# Patient Record
Sex: FEMALE | Race: WHITE | Employment: UNEMPLOYED | URBAN - METROPOLITAN AREA
[De-identification: names, ages, dates, MRNs, and addresses within clinical notes are randomized per-mention and may not be internally consistent; named-entity substitution may affect disease eponyms.]

---

## 2017-01-13 ENCOUNTER — TRANSFERRED RECORDS (OUTPATIENT)
Dept: HEALTH INFORMATION MANAGEMENT | Facility: CLINIC | Age: 62
End: 2017-01-13

## 2017-01-13 ENCOUNTER — MEDICAL CORRESPONDENCE (OUTPATIENT)
Dept: HEALTH INFORMATION MANAGEMENT | Facility: CLINIC | Age: 62
End: 2017-01-13

## 2017-01-24 ENCOUNTER — PRE VISIT (OUTPATIENT)
Dept: OTOLARYNGOLOGY | Facility: CLINIC | Age: 62
End: 2017-01-24

## 2017-01-24 NOTE — TELEPHONE ENCOUNTER
ENT PRE VISIT NOTE    On the phone call:    Date of Call: January 24, 2017  Date of appointment: February 2, 2017  Reason for Call (Should match scheduling appointment note): Inspiratory stridor, subglottic stenosis   Who made the initial call:  (patient, Parent (Name of Parent), referral source) Elis & patient   Who is the Referring Provider? (Name, address, phone number, location of clinic) [facility]  Where have you been seen for this condition? [facility]   Where and what testing has been done including: None  ENT related surgeries in the past: No       Request medical records: No  From where:   What date:   Who did you speak to:  Is the information already in the EMR? No         Have films been pushed to PAC and when? no.     Have slides been sent to pathology and when?      Have outside records been received? Yes

## 2017-02-03 ENCOUNTER — OFFICE VISIT (OUTPATIENT)
Dept: OTOLARYNGOLOGY | Facility: CLINIC | Age: 62
End: 2017-02-03

## 2017-02-03 VITALS — HEIGHT: 68 IN | WEIGHT: 225 LBS | BODY MASS INDEX: 34.1 KG/M2

## 2017-02-03 DIAGNOSIS — R06.00 DYSPNEA: ICD-10-CM

## 2017-02-03 DIAGNOSIS — R43.2 AGEUSIA: ICD-10-CM

## 2017-02-03 DIAGNOSIS — R06.03 RESPIRATORY DISTRESS: ICD-10-CM

## 2017-02-03 DIAGNOSIS — R49.0 DYSPHONIA: Primary | ICD-10-CM

## 2017-02-03 DIAGNOSIS — R43.0 ANOSMIA: Primary | ICD-10-CM

## 2017-02-03 DIAGNOSIS — R49.0 DYSPHONIA: ICD-10-CM

## 2017-02-03 PROBLEM — Z95.0 PRESENCE OF CARDIAC PACEMAKER: Status: ACTIVE | Noted: 2017-02-03

## 2017-02-03 PROBLEM — I49.9 CARDIAC ARRHYTHMIA: Status: ACTIVE | Noted: 2017-02-03

## 2017-02-03 PROBLEM — I49.5 SINOATRIAL NODE DYSFUNCTION (H): Status: ACTIVE | Noted: 2017-02-03

## 2017-02-03 PROBLEM — Q21.11 PERSISTENT OSTIUM SECUNDUM: Status: ACTIVE | Noted: 2017-02-03

## 2017-02-03 PROBLEM — I48.91 ATRIAL FIBRILLATION (H): Status: ACTIVE | Noted: 2017-02-03

## 2017-02-03 PROBLEM — R06.89 DIFFICULTY BREATHING: Status: ACTIVE | Noted: 2017-02-03

## 2017-02-03 PROBLEM — Q24.9 CONGENITAL ANOMALY OF HEART: Status: ACTIVE | Noted: 2017-02-03

## 2017-02-03 PROBLEM — Q25.1 COARCTATION OF AORTA: Status: ACTIVE | Noted: 2017-02-03

## 2017-02-03 PROBLEM — R69: Status: ACTIVE | Noted: 2017-02-03

## 2017-02-03 RX ORDER — PNV NO.95/FERROUS FUM/FOLIC AC 28MG-0.8MG
TABLET ORAL
COMMUNITY

## 2017-02-03 RX ORDER — PREDNISONE 10 MG/1
TABLET ORAL
COMMUNITY
End: 2020-05-13

## 2017-02-03 RX ORDER — LEVOTHYROXINE SODIUM 75 UG/1
TABLET ORAL
COMMUNITY

## 2017-02-03 RX ORDER — POTASSIUM CHLORIDE 1.5 G/1.58G
POWDER, FOR SOLUTION ORAL
COMMUNITY

## 2017-02-03 RX ORDER — IRBESARTAN 300 MG/1
300 TABLET ORAL
COMMUNITY
End: 2020-05-13

## 2017-02-03 RX ORDER — WARFARIN SODIUM 5 MG/1
TABLET ORAL
COMMUNITY
Start: 2014-01-30 | End: 2020-05-13

## 2017-02-03 RX ORDER — WARFARIN SODIUM 5 MG/1
TABLET ORAL
COMMUNITY
End: 2020-05-13

## 2017-02-03 RX ORDER — SPIRONOLACTONE 25 MG/1
TABLET ORAL
COMMUNITY
End: 2020-05-13

## 2017-02-03 RX ORDER — FUROSEMIDE 40 MG
40 TABLET ORAL
COMMUNITY
Start: 2016-05-26 | End: 2020-05-13

## 2017-02-03 ASSESSMENT — PAIN SCALES - GENERAL: PAINLEVEL: NO PAIN (0)

## 2017-02-03 NOTE — Clinical Note
2/3/2017       RE: Kaylah Walker  [address]  [facility] [address]     Dear Colleague,    Thank you for referring your patient, Kaylah Walker, to the [facility]. Please see a copy of my visit note below.    [facility] VOICE [facility]  Carlos Durbin Jr., M.D., F.A.C.S.  Aracelis Sharpe M.D., M.P.H.  Fide Arana, Ph.D., CCC/SLP  Michelle Bliss M.M. (voice), M.A., CCC/SLP  Ronni Valdovinos M.M. (voice), M.A., 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 Sharpe.  Please refer to Dr. Sharpe s dictation for a more complete history and impressions.     DIAGNOSIS/REASON FOR REFERRAL  Dysphonia/ Evaluate, perform laryngeal exam, treat as appropriate    HISTORY OF VOICE DISORDER  Ms. Walker is a 62 year old  with a history of dysphonia.  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    Symptoms worsened and increased in frequency from September through November of 2016, and she began to notice noisy respiration, difficulty breathing, and sudden increased breathing difficulty depending on her posture.    She completed three courses of prednisone.  The first was most beneficial, while the second provided limited help, and the third course was discontinued after she developed high blood pressure.    currently she manages her respiration by more carefully monitoring her posture, and walking more slowly.    She notes that around November, she also lost her sense of smell/ taste, which was a symptom her mother also  "developed, so she has not thought too much about it.    She also has difficulty positively identifying hot and cold temperatures due to a neuropathy    CURRENT SYMPTOMS INCLUDE:  VOICE    Quality changes with posture; unpredictable    Only effortful to speak when she is experiencing shortness of breath symptoms.  COUGH/AIRWAY    Mostly dry    No consistent triggers, other than while symptomatic or with exertion  SWALLOWING    n/a    OTHER PERTINENT HISTORY    Complex medical history: please also refer to Dr. Sharpe's dictation.     Hx of heart and lung issues.    Sodium is restricted    OBJECTIVE FINDINGS  Patient Supplied Answers To [facility] Intake Voice Questionnaire  No flowsheet data found.     Patient Supplied Answers To Last 2 VHI Questionnaires  Voice Handicap Index (VHI-10) 2/1/2017   How often do you have any of the following symptoms:  Indigestion, heartburn, chest pain, stomach acid coming up, and/or tasting acid in your mouth or throat? Never   (F1) My voice makes it difficult for people to hear me. Almost never   (F2) People have difficulty understanding me in a noisy room. Sometimes   (F8) My voice difficulties restrict my personal and social life. Sometimes   (F9) I feel left out of conversations because of my voice. Never   (F10) My voice problem causes me to lose income. Never   (P5) I feel as though I have to strain to produce voice. Never   (P6) The clarity of my voice is unpredictable. Sometimes   (E4) My voice problem upsets me. Almost never   (E6) My voice makes me feel handicapped. Never   (P3) People ask, \"What's wrong with your voice?\" Never   VHI Total Score 8        Patient Supplied Answers To CSI Questionnaire  Cough Severity Index (CSI) 2/1/2017   My cough is worse when I lie down. Sometimes   My coughing problem causes me to restrict my personal and social life. Sometimes   I tend to avoid places because of my cough problem. Sometimes   I feel embarrassed because of my coughing problem. " "Never   People ask, \"What's wrong?\" because I cough a lot. Never   I run out of air when I cough. Sometimes   My coughing problem affects my voice. Sometimes   My coughing problem limits my physical activity. Sometimes   My coughing problem upsets me. Sometimes   People ask me if I am sick because I cough a lot. Sometimes   CSI Total Score 16        Patient Supplied Answers To EAT Questionnaire  Eating Assessment Tool (EAT-10) 1/30/2017   My swallowing problem has caused me to lose weight. 0   My swallowing problem interferes with my ability to go out for meals. 0   Swallowing solids takes extra effort. 0   Swallowing pills takes extra effort. 0   Swallowing is painful. 0   The pleasure of eating is affected by my swallowing. 0   When I swallow food sticks in my throat. 0   I cough when I eat. 0   Swallowing is stressful. 0   How often do things go down the wrong way when you swallow? Rarely   Have you had pneumonia, bronchitis, or another severe lung infection in the last 6 months? No   EAT Total Score 0       VOICE AND SPEECH EVALUATION  An evaluation of the voice, speech and breathing was accomplished and audio recorded today; salient features are as follows:    Breathing pattern: inspirations are inadequate in volume and frequency, clavicular elevation on inspiration, clavicular muscle use pattern , shoulder and neck involvement, shallow and phonation is not coordinated with respiration    Tension is evident: upper body and neck and shoulders    VOICE:    Mild breathiness    Mild to moderate roughness    Minimal strain    Mild pitch instability    Mild audible respiration during inhalation    Habitual pitch was not formally tested, but is judged to be WNL and appropriate    Intensity:     Conversational speech - informally judged to be WNL    Sustained phonation:    /a/ - WNFL    Pitch Glide task    Low pitch - mild to moderate roughness, breathiness and strain    Singing vs. Speech - same effort    She states " today is a typical voice day    She rates her effort as n/a out of 10 (10 is maximum effort) for speech; na/ out of 10 for singing    She rates overall voice quality as n/a out of 10 (10 being worst)    GLOBAL ASSESSMENT OF DYSPHONIA:  25/100    CAPE-V Overall Severity:  32/100    COUGH/AIRWAY:    Occasional    Dry    LARYNGEAL EXAMINATION  Dr. Sharpe accomplished the endoscopic laryngeal examination today.  I provided technical support, and provided the protocol of instructions for the patient.  Verbal consent was obtained and witnessed prior to this procedure.   A time-out was performed, verifying patient, procedure, and site.   Type of exam:   Procedure: Flexible endoscopy with chip-tip technology without stroboscopy, left nostril; topical anesthesia with 3% Lidocaine and 0.25% phenylephrine was applied.   This exam shows:    Essentially healthy laryngeal mucosa    Signs of reflux: no remarkable signs of reflux    Secretions:  mild presence of thickened secretions on the vocal folds and throughout the laryngeal area    Vocal fold mucosa:  within normal limits, no visible lesions    Vocal fold function: Vocal folds are mobile and meet at midline    Movement is brisk and symmetric    Exam is neurologically normal     Airway is adequate    elongation of the vocal folds for pitch increase is normal    fatigue is evident during a task of 20 quickly repeated vowels; poor coordination between breath flow and phonation    Moderate anterior-posterior constriction of the supraglottic larynx during connected speech     Therapy probes show improved airway with use of oral and nasal configurations    Stroboscopy was not warranted      Open upper airway      Abducted view    Dr. Sharpe and I reviewed this laryngeal exam with Ms. Walker today, and I provided pertinent explanations, as well as written and oral information.    THERAPEUTIC ACTIVITIES  Today Ms. Walker participated in the following therapeutic  activities:    Asked many questions about the nature of her symptoms, and I answered all of these thoroughly.    [place] the following concepts/strategies/techniques to reduce thickened secretions and laryngeal irritation/hypersensitivity saline and plain water gargling, steam and humidification.  However, saline will be avoided due to her other health issues and sodium reduction.    [place] additional concepts of vocal hygiene, especially regarding systemic hydration and reduction of vocal fold impact.    I provided instruction for techniques and strategies to reduce the chronic cough. Alternative behaviors such as voiceless glottic coup, humming, swallowing, etc. were taught. Strategies for reducing mucosal irritation were taught.    Concepts of an optimal regimen for practice were instructed.    She should use an interval schedule of practice, with brief periods of practice frequently throughout each day    [place] concepts of volitional practice to facilitate motor learning.    A personalized handout was provided to facilitate home practice    IMPRESSIONS/ RECOMMENDATIONS/ PLAN  IMPRESSIONS / RECOMMENDATIONS  Based on today's evaluation and laryngeal examination, it appears that:    Dyspnea appears to be related to an imbalance in respiratory mechanics.    Cough/throat clear are accounted for by the hypersensitivity of the larynx and pharynx as evidenced by case history, patient complaints and absence of other organic findings; hypersensitivity is compounded by imbalance in the function of the intrinsic and extrinsic laryngeal musculature during connected speech    A course of speech therapy is recommended to optimize respiratory and vocal technique, promote reduced discomfort, effort and fatigue and help reduce chronic cough, throat clear and mucosal irritation.    We briefly began therapy today, working on strategies to improve vocal health and technique    TREATMENT PLAN  Speech therapy    DURATION/FREQUENCY  OF TREATMENT  Two weekly and four bi-weekly, one-hour sessions, with two monthly one-hour follow-up sessions    PROGNOSIS  Good prognosis for voice improvement with speech therapy and regular practice of therapeutic activities.    BARRIERS TO LEARNING/TEACHING AND LEARNING NEEDS  None/Unremarkable    GOALS  Patient goal:   1. To improve and maintain a healthy voice quality  2. To understand the problem and fix it as much as possible  3. To breathe normally and comfortably in all situations    Long-term goal(s): In 6 months, Ms. Walker will:  1. Report a week of typical activitiy  with comfortable respiration 90% of the time, per pt report    PRIMARY ICD-10 code:  R49.0 (Dysphonia) - used for assessment of possible vocal cord dysfunction without MD diagnosis  SECONDARY ICD-10 code:  R06.00 (Dyspnea)     TOTAL SERVICE TIME: 45 minutes  EVALUATION OF VOICE AND RESONANCE: (80640): 30 minutes    TREATMENT (39525): 15 minutes  NO CHARGE FACILITY FEE (88342)    Michelle Bliss M.M. (voice), M.A., CCC/SLP  Speech-Language Pathologist  [facility]  [phone]

## 2017-02-03 NOTE — Clinical Note
"2/3/2017      RE: [place]lah Walker  [address] [facility] [address]       Dear Dr. Livingston:    I had the pleasure of meeting Kaylah Walker in consultation at the [facility] of the [facility] at your request, for evaluation of breathing problems. The note from our visit follows.  I appreciate the opportunity to participate in the care of this pleasant patient.    Please feel free to contact me with any questions. Hope you are doing well.    Sincerely yours,    Aracelis Sharpe M.D., M.P.H.  , Laryngology  Director, [facility]  Otolaryngology- Head & Neck Surgery  [phone]          =====    History of Present Illness:   Kaylah Walker is a pleasant 62 year old female with a history of renal failure and cardiac pacemaker who presents with a *** history of {throatsx:395807::\"throat concerns\"}. Symptoms include {[facility] E[facility] VOICESX MISONO:590126}.    Voice  ***She sometimes has some voice problems; she particularly notes that if she lies down her voice changes. Unpredictable vocal changes  Unstable pitch  Not particularly seeking care for voice problem    Swallowing  ***No concerns; things rarely get down the wrong pipe    Cough/Throat-clearing  ***She reports that she is told that she coughs often, no obvious pattern that she has noted.  Greater exertion does seem to provoke coughing    Breathing  ***She has had breathing problems on and off for years, with some worseing over the past 4 months. She has episodes in which her throat feels constricted and she is gasping for air. Prior treatment inlcude inhaler in September, which helped, and nebulizer in November, which did not. There is an episodic component where symptoms will come on/off, but usuallyphsyical activity is a more consistent trigger. Prednisone helped significantly initially but the benefithas decreased over time. Since November there have been more bad " days than good days. Currently she can walk from her car into work without having to stop, but needs to move slowly. She does believe there may be some environmental triggers.    Preior intubation with last surgery in 2007    Exercise tolerance has been limited to walking a couple blocks due to underlying cardiac and lung problems, but more recently it has lazaro more difficult    Reflux-type symptoms  She never experiences heartburn/indigestion. She is not taking reflux medications.      ***  Prior outside records were reviewed for this visit. Incidentally she reports a loss of her sense of taste and smell as of November; she reports that her mother had the same issue.    MEDICATIONS:     Current Outpatient Prescriptions   Medication Sig Dispense Refill     warfarin (COUMADIN) 5 MG tablet        warfarin (COUMADIN) 5 MG tablet        levothyroxine (SYNTHROID) 75 MCG tablet        spironolactone (ALDACTONE) 25 MG tablet        predniSONE (DELTASONE) 10 MG tablet        potassium bicarbonate (K-VESCENT) 25 MEQ solu-tab Take 25 mEq by mouth       potassium chloride (KLOR-CON) 20 MEQ Packet        IRON PO Take 325 mg by mouth       furosemide (LASIX) 40 MG tablet Take 40 mg by mouth       irbesartan (AVAPRO) 300 MG tablet Take 300 mg by mouth       Ferrous Sulfate (IRON) 325 (65 FE) MG tablet          ALLERGIES:  No Known Allergies    PAST MEDICAL HISTORY:   Past Medical History   Diagnosis Date     Heart disease         PAST SURGICAL HISTORY:   Past Surgical History   Procedure Laterality Date     Ent surgery     Pacemaker placement    HABITS/SOCIAL HISTORY:    Social History   Substance Use Topics     Smoking status: Not on file     Smokeless tobacco: Not on file     Alcohol Use: Not on file         FAMILY HISTORY:  No family history on file.    REVIEW OF SYSTEMS:  The patient completed a comprehensive 11 point review of systems (below), which was reviewed. Positives are as noted below; pertinent findings are as noted  in the HPI.     Patient Supplied Answers to Review of Systems   ENT ROS 1/30/2017   Constitutional Unexplained fatigue, Problems with sleep   Ears, Nose, Throat Trouble swallowing   Cardiopulmonary Cough, Breathing problems, Wheezing   Hematologic Easy bruising   Endocrine Thirst, Frequent urination       PHYSICAL EXAMINATION:  ***  Intake scores  Total Score for Last Patient-Answered VHI Questionnaire  VHI Total Score 2/1/2017   VHI Total Score 8     Total Score for Last Patient-Answered EAT Questionnaire  EAT Total Score 1/30/2017   EAT Total Score 0     Total Score for Last Patient-Answered CSI Questionnaire  CSI Total Score 2/1/2017   CSI Total Score 16             PROCEDURE: ***      LABS:    IMAGING/RESULTS reviewed, with pertinent report excerpts below:        IMPRESSION AND PLAN: ***  Kaylah Walker presents with ***     anosmia ageusia***    She will return *** or sooner as needed. I appreciate the opportunity to participate in the care of this patient.             Aracelis Sharpe MD

## 2017-02-03 NOTE — PATIENT INSTRUCTIONS
Plan of Care:  1. Plan to initiate speech therapy with Michelle Bliss  2. Plan to follow up with PCP regarding plan of care    Joanna Farley RN  [phone]  [facility]   Head & Neck Surgery

## 2017-02-03 NOTE — PROGRESS NOTES
Dear Dr. Livingston:    I had the pleasure of meeting Kaylah Walker in consultation at the [facility] of the [facility] at your request, for evaluation of breathing problems. The note from our visit follows.  I appreciate the opportunity to participate in the care of this pleasant patient.    Please feel free to contact me with any questions. Hope you are doing well.    Sincerely yours,    Aracelis Sharpe M.D., M.P.H.  , Laryngology  Director, [facility]  Otolaryngology- Head & Neck Surgery  [phone]          =====    History of Present Illness:   Kaylah Walker is a pleasant 62-year-old female with a history of renal failure and cardiac pacemaker who presents with a history of breathing problems and throat concerns. Symptoms include throat tightness, dry throat, lump in throat, shortness of breath, change in vocal pitch, change in vocal volume and breathy voice.      Voice  She sometimes has some voice problems and she particularly notes that if she lies down, her voice changes. She notes unpredictable vocal changes and an unstable pitch. She is not particularly seeking care for voice problem.      Swallowing  No concerns. Things rarely get down the wrong pipe.      Cough/Throat-clearing  She reports that she is told that she coughs often, and there is no obvious pattern that she has noted.  Greater exertion does seem to provoke coughing.      Breathing  She has had breathing problems on and off for years, with some worsening over the past four months. She has episodes in which her throat feels constricted and she is gasping for air. Prior treatment included inhaler in September, which helped, and nebulizer in November, which did not. There is an episodic component where symptoms will come on/off, but usually physical activity is a more consistent trigger. Prednisone helped significantly initially, but the  benefit has decreased over time. Since November, there have been more bad days than good days. Currently she can walk from her car into work without having to stop, but needs to move slowly. She does believe there may be some environmental triggers.    She had a prior intubation with last surgery in 2007.    Exercise tolerance has been limited to walking a couple blocks due to underlying cardiac and lung problems, but more recently it has been more difficult.      Reflux-type symptoms  She never experiences heartburn/indigestion. She is not taking reflux medications.      Prior outside records were reviewed for this visit. Incidentally, she reports a loss of her sense of taste and smell as of November. She reports that her mother had the same issue.    MEDICATIONS:     Current Outpatient Prescriptions   Medication Sig Dispense Refill     warfarin (COUMADIN) 5 MG tablet        warfarin (COUMADIN) 5 MG tablet        levothyroxine (SYNTHROID) 75 MCG tablet        spironolactone (ALDACTONE) 25 MG tablet        predniSONE (DELTASONE) 10 MG tablet        potassium bicarbonate (K-VESCENT) 25 MEQ solu-tab Take 25 mEq by mouth       potassium chloride (KLOR-CON) 20 MEQ Packet        IRON PO Take 325 mg by mouth       furosemide (LASIX) 40 MG tablet Take 40 mg by mouth       irbesartan (AVAPRO) 300 MG tablet Take 300 mg by mouth       Ferrous Sulfate (IRON) 325 (65 FE) MG tablet          ALLERGIES:  No Known Allergies    PAST MEDICAL HISTORY:   Past Medical History   Diagnosis Date     Heart disease         PAST SURGICAL HISTORY:   Past Surgical History   Procedure Laterality Date     Ent surgery     Pacemaker placement    HABITS/SOCIAL HISTORY:    Social History   Substance Use Topics     Smoking status: Not on file     Smokeless tobacco: Not on file     Alcohol Use: Not on file         FAMILY HISTORY:  No family history on file.    REVIEW OF SYSTEMS:  The patient completed a comprehensive 11 point review of systems (below),  which was reviewed. Positives are as noted below; pertinent findings are as noted in the HPI.     Patient Supplied Answers to Review of Systems   ENT ROS 1/30/2017   Constitutional Unexplained fatigue, Problems with sleep   Ears, Nose, Throat Trouble swallowing   Cardiopulmonary Cough, Breathing problems, Wheezing   Hematologic Easy bruising   Endocrine Thirst, Frequent urination       PHYSICAL EXAMINATION:  General: The patient was alert and conversant, and in no acute distress.    Eyes: PERRL, conjunctiva and lids normal, sclera nonicteric.  Nose: Anterior rhinoscopy: no gross abnormalities. no  bleeding; no  mucopurulence; septum grossly normal, mild mucoid drainage and/or crusting.  Oral cavity/oropharynx: No masses or lesions. Dentition in fair condition. Floor of mouth and oral tongue soft to palpation. Tongue mobility and palate elevation intact and symmetric.  Ears: Normal auricles, external auditory canals bilaterally. Visualized portions of tympanic membranes normal bilaterally.   Neck: No palpable cervical lymphadenopathy. There was no significant tenderness to palpation of the thyrohyoid space. No obvious thyroid abnormality. Landmarks palpable.  Resp: Breathing comfortably, no stridor or stertor. Mildly noisy inhalation and exhalation.  Neuro: Symmetric facial function. Other cranial nerves as documented above.  Psych: Normal affect, pleasant and cooperative.  Voice/speech: Mild to moderate dysphonia characterized by breathiness, roughness and strain.  Extremities: No cyanosis, clubbing, or edema of the upper extremities.    Intake scores  Total Score for Last Patient-Answered VHI Questionnaire  VHI Total Score 2/1/2017   VHI Total Score 8     Total Score for Last Patient-Answered EAT Questionnaire  EAT Total Score 1/30/2017   EAT Total Score 0     Total Score for Last Patient-Answered CSI Questionnaire  CSI Total Score 2/1/2017   CSI Total Score 16       PROCEDURE:   Flexible fiberoptic  laryngoscopy  Indications: This procedure was warranted to evaluate the patient's laryngeal function. Risks, benefits, and alternatives were discussed.  Description: After written informed consent was obtained, a time-out was performed to confirm patient identity, procedure, and procedure site. Topical 3% lidocaine with 0.25% phenylephrine was applied to the nasal cavities. 4cc 4% lidocaine was inhaled. I performed the endoscopy and no complications were apparent.  Performed by: Aracelis Sharpe MD MPH  SLP: Michelle Bliss MM, MA, CCC-SLP  Findings: Normal nasopharynx. Normal base of tongue, valleculae, and epiglottis. The right true vocal fold demonstrated normal mobility. The left true vocal fold demonstrated normal mobility. The medial edges of the vocal folds appeared smooth and straight. No focal mucosal lesions were observed on the true vocal folds. Glissade produced appropriate elongation. There was moderate supraglottic recruitment with connected speech. Mucosa of the false vocal folds, aryepiglottic folds, piriform sinuses, and posterior glottis unremarkable. Airway (including subglottis and upper trachea) was patent. Sticky secretions scattered throughout.      IMPRESSION AND PLAN:   Kaylah Walker presents with a patent subglottic and tracheal airway. She did manifest some respiratory incoordination, and therefore I recommended speech therapy as initial primary management, with goals including improving laryngeal efficiency and improving respiratory/phonatory coordination. We discussed that she may also have some underlying pulmonary/cardiac contribution that would need to be further evaluated, but learning the breathing techniques will help reduce any contribution at the level of the vocal folds.    She incidentally reported anosmia and ageusia that seems fairly profound, within the past several months. We discussed consideration of imaging of the head and neck to evaluate this further; she would like  to discuss this with her primary care provider Dr. Nelson.    She will return as needed. I appreciate the opportunity to participate in the care of this pleasant patient.

## 2017-02-03 NOTE — Clinical Note
2/3/2017       RE: Kaylah Walker  [id number]  [place] [facility] [place] [facility] [place] [place] [id number]     Dear Colleague,    Thank you for referring your patient, Kaylah Walker, to the [facility] at [facility]. Please see a copy of my visit note below.    Dear Dr. Livingston:    I had the pleasure of meeting Kaylah Walker in consultation at the [facility] of the [facility] at your request, for evaluation of breathing problems. The note from our visit follows.  I appreciate the opportunity to participate in the care of this pleasant patient.    Please feel free to contact me with any questions. Hope you are doing well.    Sincerely yours,    Aracelis Sharpe M.D., M.P.H.  , Laryngology  Director, [facility]  Otolaryngology- Head & Neck Surgery  [phone]          =====    History of Present Illness:   Kaylah Walker is a pleasant 62-year-old female with a history of renal failure and cardiac pacemaker who presents with a history of breathing problems and throat concerns. Symptoms include throat tightness, dry throat, lump in throat, shortness of breath, change in vocal pitch, change in vocal volume and breathy voice.      Voice  She sometimes has some voice problems and she particularly notes that if she lies down, her voice changes. She notes unpredictable vocal changes and an unstable pitch. She is not particularly seeking care for voice problem.      Swallowing  No concerns. Things rarely get down the wrong pipe.      Cough/Throat-clearing  She reports that she is told that she coughs often, and there is no obvious pattern that she has noted.  Greater exertion does seem to provoke coughing.      Breathing  She has had breathing problems on and off for years, with some worsening over the past four months. She has episodes in which her throat feels constricted and she is gasping for air.  Prior treatment included inhaler in September, which helped, and nebulizer in November, which did not. There is an episodic component where symptoms will come on/off, but usually physical activity is a more consistent trigger. Prednisone helped significantly initially, but the benefit has decreased over time. Since November, there have been more bad days than good days. Currently she can walk from her car into work without having to stop, but needs to move slowly. She does believe there may be some environmental triggers.    She had a prior intubation with last surgery in 2007.    Exercise tolerance has been limited to walking a couple blocks due to underlying cardiac and lung problems, but more recently it has been more difficult.      Reflux-type symptoms  She never experiences heartburn/indigestion. She is not taking reflux medications.      Prior outside records were reviewed for this visit. Incidentally, she reports a loss of her sense of taste and smell as of November. She reports that her mother had the same issue.    MEDICATIONS:     Current Outpatient Prescriptions   Medication Sig Dispense Refill     warfarin (COUMADIN) 5 MG tablet        warfarin (COUMADIN) 5 MG tablet        levothyroxine (SYNTHROID) 75 MCG tablet        spironolactone (ALDACTONE) 25 MG tablet        predniSONE (DELTASONE) 10 MG tablet        potassium bicarbonate (K-VESCENT) 25 MEQ solu-tab Take 25 mEq by mouth       potassium chloride (KLOR-CON) 20 MEQ Packet        IRON PO Take 325 mg by mouth       furosemide (LASIX) 40 MG tablet Take 40 mg by mouth       irbesartan (AVAPRO) 300 MG tablet Take 300 mg by mouth       Ferrous Sulfate (IRON) 325 (65 FE) MG tablet          ALLERGIES:  No Known Allergies    PAST MEDICAL HISTORY:   Past Medical History   Diagnosis Date     Heart disease         PAST SURGICAL HISTORY:   Past Surgical History   Procedure Laterality Date     Ent surgery     Pacemaker placement    HABITS/SOCIAL HISTORY:     Social History   Substance Use Topics     Smoking status: Not on file     Smokeless tobacco: Not on file     Alcohol Use: Not on file         FAMILY HISTORY:  No family history on file.    REVIEW OF SYSTEMS:  The patient completed a comprehensive 11 point review of systems (below), which was reviewed. Positives are as noted below; pertinent findings are as noted in the HPI.     Patient Supplied Answers to Review of Systems   ENT ROS 1/30/2017   Constitutional Unexplained fatigue, Problems with sleep   Ears, Nose, Throat Trouble swallowing   Cardiopulmonary Cough, Breathing problems, Wheezing   Hematologic Easy bruising   Endocrine Thirst, Frequent urination       PHYSICAL EXAMINATION:  General: The patient was alert and conversant, and in no acute distress.    Eyes: PERRL, conjunctiva and lids normal, sclera nonicteric.  Nose: Anterior rhinoscopy: no gross abnormalities. no  bleeding; no  mucopurulence; septum grossly normal, mild mucoid drainage and/or crusting.  Oral cavity/oropharynx: No masses or lesions. Dentition in fair condition. Floor of mouth and oral tongue soft to palpation. Tongue mobility and palate elevation intact and symmetric.  Ears: Normal auricles, external auditory canals bilaterally. Visualized portions of tympanic membranes normal bilaterally.   Neck: No palpable cervical lymphadenopathy. There was no significant tenderness to palpation of the thyrohyoid space. No obvious thyroid abnormality. Landmarks palpable.  Resp: Breathing comfortably, no stridor or stertor. Mildly noisy inhalation and exhalation.  Neuro: Symmetric facial function. Other cranial nerves as documented above.  Psych: Normal affect, pleasant and cooperative.  Voice/speech: Mild to moderate dysphonia characterized by breathiness, roughness and strain.  Extremities: No cyanosis, clubbing, or edema of the upper extremities.    Intake scores  Total Score for Last Patient-Answered VHI Questionnaire  VHI Total Score 2/1/2017    VHI Total Score 8     Total Score for Last Patient-Answered EAT Questionnaire  EAT Total Score 1/30/2017   EAT Total Score 0     Total Score for Last Patient-Answered CSI Questionnaire  CSI Total Score 2/1/2017   CSI Total Score 16       PROCEDURE:   Flexible fiberoptic laryngoscopy  Indications: This procedure was warranted to evaluate the patient's laryngeal function. Risks, benefits, and alternatives were discussed.  Description: After written informed consent was obtained, a time-out was performed to confirm patient identity, procedure, and procedure site. Topical 3% lidocaine with 0.25% phenylephrine was applied to the nasal cavities. 4cc 4% lidocaine was inhaled. I performed the endoscopy and no complications were apparent.  Performed by: Aracelis Sharpe MD MPH  SLP: Michelle Bliss MM, MA, CCC-SLP  Findings: Normal nasopharynx. Normal base of tongue, valleculae, and epiglottis. The right true vocal fold demonstrated normal mobility. The left true vocal fold demonstrated normal mobility. The medial edges of the vocal folds appeared smooth and straight. No focal mucosal lesions were observed on the true vocal folds. Glissade produced appropriate elongation. There was moderate supraglottic recruitment with connected speech. Mucosa of the false vocal folds, aryepiglottic folds, piriform sinuses, and posterior glottis unremarkable. Airway (including subglottis and upper trachea) was patent. Sticky secretions scattered throughout.      IMPRESSION AND PLAN:   Kaylah Walker presents with a patent subglottic and tracheal airway. She did manifest some respiratory incoordination, and therefore I recommended speech therapy as initial primary management, with goals including improving laryngeal efficiency and improving respiratory/phonatory coordination. We discussed that she may also have some underlying pulmonary/cardiac contribution that would need to be further evaluated, but learning the breathing techniques  will help reduce any contribution at the level of the vocal folds.    She incidentally reported anosmia and ageusia that seems fairly profound, within the past several months. We discussed consideration of imaging of the head and neck to evaluate this further; she would like to discuss this with her primary care provider Dr. Nelson.    She will return as needed. I appreciate the opportunity to participate in the care of this pleasant patient.       Again, thank you for allowing me to participate in the care of your patient.      Sincerely,    Aracelis Sharpe MD

## 2017-02-03 NOTE — NURSING NOTE
Procedure: Upper aerodigestive tract endoscopy, Flexible or rigid laryngoscopy, possible stroboscopy, possible flexible endoscopic evaluation of swallowing   Reason: symptoms requiring examination   PREPROCEDURE:   Yes Patient ID verified with 2 identifiers (name and  or MRN)   Yes: Procedure and site verified with patient/designee (when able)   Yes: Accurate consent documentation in medical record   No: Marking not required. Reason: [ Procedure does not require site marking. ][ Provider is in continuous attendance with the patient from consent through completion of procedure. ][ Marking unable or refused by patient (see scanned diagram).   TIME OUT:   Yes: Time-Out performed immediately prior to starting procedure, including verbal and active participation of all team members addressing:   * Correct patient identity   * Confirmed that the correct side and site are marked   * An accurate procedure consent form   * Agreement on the procedure to be done   * Correct patient position   * Relevant images and results are properly labeled and appropriately displayed   * The need to administer antibiotics or fluids for irrigation purposes during the procedure as applicable   * Safety precations based on patient history or medication use   DURING PROCEDURE: Verification of correct person, site, and procedure occurs any time the responsibility for care of the patient is transferred to another member of the care team.   Joanna Farley RN  P Otolaryngology/Head & Neck Surgery

## 2017-02-08 NOTE — PROGRESS NOTES
[facility]  Carlos Durbin Jr., M.D., F.A.C.S.  Aracelis Sharpe M.D., M.P.H.  Fide Arana, Ph.D., CCC/SLP  Michelle Bliss M.M. (voice), M.BRITTON., CCC/SLP  Ronni Valdovinos M.M. 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 Sharpe.  Please refer to Dr. Sharpe s dictation for a more complete history and impressions.     DIAGNOSIS/REASON FOR REFERRAL  Dysphonia/ Evaluate, perform laryngeal exam, treat as appropriate    HISTORY OF VOICE DISORDER  Ms. Walker is a 62 year old  with a history of dysphonia.  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    Symptoms worsened and increased in frequency from September through November of 2016, and she began to notice noisy respiration, difficulty breathing, and sudden increased breathing difficulty depending on her posture.    She completed three courses of prednisone.  The first was most beneficial, while the second provided limited help, and the third course was discontinued after she developed high blood pressure.    currently she manages her respiration by more carefully monitoring her posture, and walking more slowly.    She notes that around November, she also lost her sense of smell/ taste, which was a symptom her mother also developed, so she has not thought too much about it.    She also has difficulty positively identifying hot and cold temperatures due to a neuropathy    CURRENT SYMPTOMS INCLUDE:  VOICE    Quality changes with posture; unpredictable    Only effortful to speak when she  "is experiencing shortness of breath symptoms.  COUGH/AIRWAY    Mostly dry    No consistent triggers, other than while symptomatic or with exertion  SWALLOWING    n/a    OTHER PERTINENT HISTORY    Complex medical history: please also refer to Dr. Sharpe's dictation.     Hx of heart and lung issues.    Sodium is restricted    OBJECTIVE FINDINGS  Patient Supplied Answers To [facility] Intake Voice Questionnaire  No flowsheet data found.     Patient Supplied Answers To Last 2 VHI Questionnaires  Voice Handicap Index (VHI-10) 2/1/2017   How often do you have any of the following symptoms:  Indigestion, heartburn, chest pain, stomach acid coming up, and/or tasting acid in your mouth or throat? Never   (F1) My voice makes it difficult for people to hear me. Almost never   (F2) People have difficulty understanding me in a noisy room. Sometimes   (F8) My voice difficulties restrict my personal and social life. Sometimes   (F9) I feel left out of conversations because of my voice. Never   (F10) My voice problem causes me to lose income. Never   (P5) I feel as though I have to strain to produce voice. Never   (P6) The clarity of my voice is unpredictable. Sometimes   (E4) My voice problem upsets me. Almost never   (E6) My voice makes me feel handicapped. Never   (P3) People ask, \"What's wrong with your voice?\" Never   VHI Total Score 8        Patient Supplied Answers To CSI Questionnaire  Cough Severity Index (CSI) 2/1/2017   My cough is worse when I lie down. Sometimes   My coughing problem causes me to restrict my personal and social life. Sometimes   I tend to avoid places because of my cough problem. Sometimes   I feel embarrassed because of my coughing problem. Never   People ask, \"What's wrong?\" because I cough a lot. Never   I run out of air when I cough. Sometimes   My coughing problem affects my voice. Sometimes   My coughing problem limits my physical activity. Sometimes   My coughing problem upsets me. Sometimes   People " ask me if I am sick because I cough a lot. Sometimes   CSI Total Score 16        Patient Supplied Answers To EAT Questionnaire  Eating Assessment Tool (EAT-10) 1/30/2017   My swallowing problem has caused me to lose weight. 0   My swallowing problem interferes with my ability to go out for meals. 0   Swallowing solids takes extra effort. 0   Swallowing pills takes extra effort. 0   Swallowing is painful. 0   The pleasure of eating is affected by my swallowing. 0   When I swallow food sticks in my throat. 0   I cough when I eat. 0   Swallowing is stressful. 0   How often do things go down the wrong way when you swallow? Rarely   Have you had pneumonia, bronchitis, or another severe lung infection in the last 6 months? No   EAT Total Score 0       VOICE AND SPEECH EVALUATION  An evaluation of the voice, speech and breathing was accomplished and audio recorded today; salient features are as follows:    Breathing pattern: inspirations are inadequate in volume and frequency, clavicular elevation on inspiration, clavicular muscle use pattern , shoulder and neck involvement, shallow and phonation is not coordinated with respiration    Tension is evident: upper body and neck and shoulders    VOICE:    Mild breathiness    Mild to moderate roughness    Minimal strain    Mild pitch instability    Mild audible respiration during inhalation    Habitual pitch was not formally tested, but is judged to be WNL and appropriate    Intensity:     Conversational speech - informally judged to be WNL    Sustained phonation:    /a/ - WNFL    Pitch Glide task    Low pitch - mild to moderate roughness, breathiness and strain    Singing vs. Speech - same effort    She states today is a typical voice day    She rates her effort as n/a out of 10 (10 is maximum effort) for speech; na/ out of 10 for singing    She rates overall voice quality as n/a out of 10 (10 being worst)    GLOBAL ASSESSMENT OF DYSPHONIA:  25/100    CAPE-V Overall Severity:   32/100    COUGH/AIRWAY:    Occasional    Dry    LARYNGEAL EXAMINATION  Dr. Sharpe accomplished the endoscopic laryngeal examination today.  I provided technical support, and provided the protocol of instructions for the patient.  Verbal consent was obtained and witnessed prior to this procedure.   A time-out was performed, verifying patient, procedure, and site.   Type of exam:   Procedure: Flexible endoscopy with chip-tip technology without stroboscopy, left nostril; topical anesthesia with 3% Lidocaine and 0.25% phenylephrine was applied.   This exam shows:    Essentially healthy laryngeal mucosa    Signs of reflux: no remarkable signs of reflux    Secretions:  mild presence of thickened secretions on the vocal folds and throughout the laryngeal area    Vocal fold mucosa:  within normal limits, no visible lesions    Vocal fold function: Vocal folds are mobile and meet at midline    Movement is brisk and symmetric    Exam is neurologically normal     Airway is adequate    elongation of the vocal folds for pitch increase is normal    fatigue is evident during a task of 20 quickly repeated vowels; poor coordination between breath flow and phonation    Moderate anterior-posterior constriction of the supraglottic larynx during connected speech     Therapy probes show improved airway with use of oral and nasal configurations    Stroboscopy was not warranted      Open upper airway      Abducted view    Dr. Sharpe and I reviewed this laryngeal exam with Ms. Walker today, and I provided pertinent explanations, as well as written and oral information.    THERAPEUTIC ACTIVITIES  Today Ms. Walker participated in the following therapeutic activities:    Asked many questions about the nature of her symptoms, and I answered all of these thoroughly.    [place] the following concepts/strategies/techniques to reduce thickened secretions and laryngeal irritation/hypersensitivity saline and plain water gargling, steam and  humidification.  However, saline will be avoided due to her other health issues and sodium reduction.    Colp additional concepts of vocal hygiene, especially regarding systemic hydration and reduction of vocal fold impact.    I provided instruction for techniques and strategies to reduce the chronic cough. Alternative behaviors such as voiceless glottic coup, humming, swallowing, etc. were taught. Strategies for reducing mucosal irritation were taught.    Concepts of an optimal regimen for practice were instructed.    She should use an interval schedule of practice, with brief periods of practice frequently throughout each day    Colp concepts of volitional practice to facilitate motor learning.    A personalized handout was provided to facilitate home practice    IMPRESSIONS/ RECOMMENDATIONS/ PLAN  IMPRESSIONS / RECOMMENDATIONS  Based on today's evaluation and laryngeal examination, it appears that:    Dyspnea appears to be related to an imbalance in respiratory mechanics.    Cough/throat clear are accounted for by the hypersensitivity of the larynx and pharynx as evidenced by case history, patient complaints and absence of other organic findings; hypersensitivity is compounded by imbalance in the function of the intrinsic and extrinsic laryngeal musculature during connected speech    A course of speech therapy is recommended to optimize respiratory and vocal technique, promote reduced discomfort, effort and fatigue and help reduce chronic cough, throat clear and mucosal irritation.    We briefly began therapy today, working on strategies to improve vocal health and technique    TREATMENT PLAN  Speech therapy    DURATION/FREQUENCY OF TREATMENT  Two weekly and four bi-weekly, one-hour sessions, with two monthly one-hour follow-up sessions    PROGNOSIS  Good prognosis for voice improvement with speech therapy and regular practice of therapeutic activities.    BARRIERS TO LEARNING/TEACHING AND LEARNING  NEEDS  None/Unremarkable    GOALS  Patient goal:   1. To improve and maintain a healthy voice quality  2. To understand the problem and fix it as much as possible  3. To breathe normally and comfortably in all situations    Long-term goal(s): In 6 months, Ms. Walker will:  1. Report a week of typical activitiy  with comfortable respiration 90% of the time, per pt report    PRIMARY ICD-10 code:  R49.0 (Dysphonia) - used for assessment of possible vocal cord dysfunction without MD diagnosis  SECONDARY ICD-10 code:  R06.00 (Dyspnea)     TOTAL SERVICE TIME: 45 minutes  EVALUATION OF VOICE AND RESONANCE: (38559): 30 minutes    TREATMENT (06440): 15 minutes  NO CHARGE FACILITY FEE (74504)    Michelle Bliss M.M. (voice) M.A., CCC/SLP  Speech-Language Pathologist  [facility]  [phone]

## 2018-01-07 ENCOUNTER — HEALTH MAINTENANCE LETTER (OUTPATIENT)
Age: 63
End: 2018-01-07

## 2019-01-18 ENCOUNTER — TRANSFERRED RECORDS (OUTPATIENT)
Dept: HEALTH INFORMATION MANAGEMENT | Facility: CLINIC | Age: 64
End: 2019-01-18

## 2019-09-05 ENCOUNTER — TRANSFERRED RECORDS (OUTPATIENT)
Dept: HEALTH INFORMATION MANAGEMENT | Facility: CLINIC | Age: 64
End: 2019-09-05

## 2019-11-06 ENCOUNTER — TRANSFERRED RECORDS (OUTPATIENT)
Dept: HEALTH INFORMATION MANAGEMENT | Facility: CLINIC | Age: 64
End: 2019-11-06

## 2019-12-11 ENCOUNTER — TRANSFERRED RECORDS (OUTPATIENT)
Dept: HEALTH INFORMATION MANAGEMENT | Facility: CLINIC | Age: 64
End: 2019-12-11

## 2019-12-12 ENCOUNTER — TRANSFERRED RECORDS (OUTPATIENT)
Dept: HEALTH INFORMATION MANAGEMENT | Facility: CLINIC | Age: 64
End: 2019-12-12

## 2019-12-31 ENCOUNTER — TRANSFERRED RECORDS (OUTPATIENT)
Dept: HEALTH INFORMATION MANAGEMENT | Facility: CLINIC | Age: 64
End: 2019-12-31

## 2020-01-24 ENCOUNTER — TRANSFERRED RECORDS (OUTPATIENT)
Dept: HEALTH INFORMATION MANAGEMENT | Facility: CLINIC | Age: 65
End: 2020-01-24

## 2020-02-04 ENCOUNTER — MEDICAL CORRESPONDENCE (OUTPATIENT)
Dept: HEALTH INFORMATION MANAGEMENT | Facility: CLINIC | Age: 65
End: 2020-02-04

## 2020-02-05 ENCOUNTER — TRANSFERRED RECORDS (OUTPATIENT)
Dept: HEALTH INFORMATION MANAGEMENT | Facility: CLINIC | Age: 65
End: 2020-02-05

## 2020-02-05 LAB — PAP-ABSTRACT: NORMAL

## 2020-02-20 ENCOUNTER — TRANSFERRED RECORDS (OUTPATIENT)
Dept: HEALTH INFORMATION MANAGEMENT | Facility: CLINIC | Age: 65
End: 2020-02-20

## 2020-03-03 ENCOUNTER — TRANSFERRED RECORDS (OUTPATIENT)
Dept: HEALTH INFORMATION MANAGEMENT | Facility: CLINIC | Age: 65
End: 2020-03-03

## 2020-03-03 LAB — HPV ABSTRACT: ABNORMAL

## 2020-03-10 ENCOUNTER — HEALTH MAINTENANCE LETTER (OUTPATIENT)
Age: 65
End: 2020-03-10

## 2020-03-11 ENCOUNTER — REFERRAL (OUTPATIENT)
Dept: TRANSPLANT | Facility: CLINIC | Age: 65
End: 2020-03-11

## 2020-03-11 DIAGNOSIS — Z01.818 PRE-TRANSPLANT EVALUATION FOR KIDNEY TRANSPLANT: ICD-10-CM

## 2020-03-11 DIAGNOSIS — N18.6 ESRD (END STAGE RENAL DISEASE) (H): Primary | ICD-10-CM

## 2020-03-11 DIAGNOSIS — N18.6 END STAGE RENAL DISEASE (H): ICD-10-CM

## 2020-03-11 DIAGNOSIS — N18.6 ESRD ON DIALYSIS (H): ICD-10-CM

## 2020-03-11 DIAGNOSIS — Z76.82 ORGAN TRANSPLANT CANDIDATE: ICD-10-CM

## 2020-03-11 DIAGNOSIS — Q24.9: ICD-10-CM

## 2020-03-11 DIAGNOSIS — Z99.2 ESRD ON DIALYSIS (H): ICD-10-CM

## 2020-03-11 NOTE — Clinical Note
Please see smart set orders for pre kidney transplant evaluation. Pt has STD for May 13th. Pt is on peritoneal dialysis. Thanks narciso

## 2020-03-11 NOTE — LETTER
04/02/20    Kaylah Walker  [address] [place] [address]    Dear Kaylah,    Thank you for your interest in the Transplant Center at [facility], [facility]. We look forward to being a part of your care team and assisting you through the transplant process.    As we discussed, your transplant coordinator is Caity Weston (Kidney).  You may call your coordinator at any time with questions or concerns.  Your first scheduled call will be on 4/10/2020.  If this needs to change, call [phone].    Please complete the following.    1. Fill out and return the enclosed forms    Authorization for Electronic Communication    Authorization to Discuss Protected Health Information    Authorization for Release of Protected Health Information    2. Sign up for:    [employer], access to your electronic medical record (see enclosed pamphlet)    [employer], a transplant education website    You can use these tools to learn more about your transplant, communicate with your care team, and track your medical details      Sincerely,      Solid Organ Transplant  [facility], [facility]    cc: Erica Nelson MD

## 2020-03-13 NOTE — TELEPHONE ENCOUNTER
Patient Call: General  Route to LPN    Reason for call: patient called regarding her intake call.    Call back needed? Yes    Return Call Needed  Same as documented in contacts section  When to return call?: Greater than one day: Route standard priority

## 2020-03-24 ENCOUNTER — TRANSFERRED RECORDS (OUTPATIENT)
Dept: HEALTH INFORMATION MANAGEMENT | Facility: CLINIC | Age: 65
End: 2020-03-24

## 2020-03-25 VITALS — HEIGHT: 68 IN | WEIGHT: 170 LBS | BODY MASS INDEX: 25.76 KG/M2

## 2020-03-25 ASSESSMENT — MIFFLIN-ST. JEOR: SCORE: 1374.61

## 2020-03-25 NOTE — TELEPHONE ENCOUNTER
PCP: Dr. Erica Nelson   Referring Provider: Self  Referring Diagnosis: CKD Stage 4    Is patient under the age of 65? Yes  Is patient diabetic? No  Is patient on insulin? No  Was patient offered a pancreas transplant referral? No    Is patient in a group home/assisted living? No  Does patient have a guardian? No    Referral intake process completed.  Patient is aware that after financial approval is received, medical records will be requested.   Patient confirmed for a callback from transplant coordinator on 4/10/2020.  (within 2 weeks)  Tentative evaluation date 5/13/2020. (within 4 weeks)    Confirmed coordinator will discuss evaluation process in more detail at the time of their call.   Patient is aware of the need to arrange age appropriate cancer screening, vaccinations, and dental care.  Reminded patient to complete questionnaire, complete medical records release, and review packet prior to evaluation visit .  Assessed patient for special needs (ie--wheelchair, assistance, guardian, and ):  No  Patient instructed to call [phone] with questions.     KELLEY Lieberman, LPN   Solid Organ Transplant

## 2020-04-06 ENCOUNTER — TRANSFERRED RECORDS (OUTPATIENT)
Dept: HEALTH INFORMATION MANAGEMENT | Facility: CLINIC | Age: 65
End: 2020-04-06

## 2020-04-10 NOTE — TELEPHONE ENCOUNTER
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 On dialysis since 01//16/2019 per 2728 form and has been on peritoneal for most of this time. 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 Pt has a permanent pacemaker as well as a defibrillator. Has atrial fibrillation - on coumadin. Mention of pulmonary hypertension - I do not see this on recent echocardiogram on 01/25/2020 with EF of 55-60% - see CE. Follows with Dr. Yovnai Trevino ( cardiologist ) at [facility], last seen Feb 2020 - see CE. No perioperative recommendation seen today from Dr. Trevino - will need this. Pt also has liver cirrhosis related to hepatopathy from CHF. Liver ultrasound done 10/11/2019 consistent with cirrhosis - see CE. Liver biopsy done December 2019 - in CE. Pt also takes lactulose regularly. GI bleed episode January 2020 with blood transfusions - see CE. Admitted to [facility] 10/08/2019 to 10/11/2019 for acute hepatic encephalopathy with elevated ammonia levels. Not sure where pt follows with GI. No history of smoking, alcohol use or recreational drug use.  BMI 26.7. Need to talk with pt to get better information about cardiac and liver status.     Contacted patient and introduced myself as their Transplant Coordinator, also introduced the role of the Transplant Coordinator in the transplant process.  Explained the purpose of this call including reviewing next steps and answering questions.  Had a lengthy conversation with pt today, she is very knowledgeable and articulate about her health status. She explained that she was told she needed to be evaluated for transplant at our Center or [place] because she may need a kidney-liver or maybe just kidney. Pt explained Dr. Frazier told her heart status is good to proceed with kidney transplant surgery. I instructed pt to call Dr. Frazier and ask for pre-operative clearance to proceed with kidney  transplant surgery. Pt stating she follows with [facility] - told her I will request records. Explained when both of these are available will determine next steps in her evaluation.   Confirmed Referring Provider, Dialysis Center, and Primary Care Physician. Notified patient of the importance of continued communication with referring providers and primary care physicians.    Reviewed components of transplant evaluation process including necessary appointments, tests, and procedures.    Answered questions for patient regarding evaluation, provided my name and contact information and requested they call with any additional questions.    Determined that patient would like additional information regarding transplant by:     Drop Down choices: Mail, Email, MyChart, Phone Call   Encourage Ami   Reviewed with pt that her STD is May 13th, but should have cardiologist clearance and [facility] records available before confirming schedule. Pt will check with her cardiologist and I will order [facility] records. Will call pt back when I have received. Pt expressed very good understanding of all and was in good agreement with the plan.

## 2020-04-23 PROBLEM — Z99.2 ESRD ON DIALYSIS (H): Status: ACTIVE | Noted: 2020-04-23

## 2020-04-23 PROBLEM — N18.6 ESRD ON DIALYSIS (H): Status: ACTIVE | Noted: 2020-04-23

## 2020-04-23 NOTE — TELEPHONE ENCOUNTER
Received good amount of [facility] records as well as a letter from Dr. Yovani Trevino ( cardiologist ) dated 04/15/2020 that no additional testing is needed prior to proceeding with kidney transplant surgery. Called pt again today and explained that sufficient records have been received at this time for her to be scheduled for a pre kidney transplant evaluation, pt agreeable to proceeding. Explained a  from our Office will call her to confirm evaluation date and also instructions about whether this will be in person or virtual appts. I did explain the components and venues of each option. Pt stating she has not yet watched [facility], I instructed her on how to use [facility] and to watch pre kidney eval part 1 - pt agreeable to doing so. Reviewed some of what having a kidney transplant surgery, hospitalization experience involved and routine post op regimen today in response to patient's questions. Pt expressed very good understanding of all and was in good agreement with the plan.     Smart set orders into Epic today for pre kidney transplant evaluation - routed to .

## 2020-05-07 NOTE — PROGRESS NOTES
Assessment and Plan:  # Kidney Transplant Evaluation: Patient is a reasonable candidate overall. Benefits of a living donor transplant were discussed.    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 On peritoneal dialysis since January 2019, recently complicated by peritonitis.  She would likely benefit from a kidney transplant. Her sister is interested in donating.    # Congenital heart disease (coarctation of aorta, ASD/ostrium secundum, VSD): for which she had two surgeries has a child. S/p permanent pacemaker and cardiomems device placement. HFrEF (45-50% as of 8/2018). Atrial fibrillation on Eliquis. She will need a cardiac risk assessment and futher testing for CAD.    # Compensated liver cirrhosis from CHF: with a transjugular liver biopsy in 12/2019 that showed venous outflow obstruction and bridging fibrosis with a mean portosystemic gradient of 6 mmHg.  EGD in 10/2019 showed no varices. LFTS wnls, platelet counts ranging 160-200 K range.      # GI bleeding in January 2020: initially thought to be from ischemia colitis d/t hypotension. She continued to ongoing bleeding issues that was later contributed to supratherapeutic INR and her Coumadin was changed to Eliquis. CT and colonoscopy was unremarkable. She has not has any bleeding recurrence.     # Asymptomatic hypotension: stable with SBPs ranging 103-109 mmHg as of late. She has not required Midodrine since last summer. EF 45-50%.     # Health Maintenance: 2/2020 colonoscopy (normal) UTD, mammogram not UTD, will request 2/2020 pap smear records from [facility] in [place], some dental work is pending.    Discussed the risks and benefits of a transplant, including the risk of surgery and immunosuppression medications.  Patient's overall evaluation will be discussed in the Transplant Program's regular meeting with a final recommendation on the patients suitability for transplant to be made at that time.  Patient was seen in  conjunction with Dr. Nathan Amaya as part of a shared visit.    Evaluation:  Kaylah Walker was seen in consultation at the request of Dr. Vin Sheldon for evaluation as a potential kidney transplant recipient.    Reason for Visit:  Kaylah Walker is a 65 year old female with ESKD likely from CRS, who presents for kidney transplant evaluation.    History of Present Illness:  Kaylah Walker is a 65-year-old female with history of ESKD likely secondary to presumed CRS and has been on peritoneal dialysis since January 2019. She has history of congenital heart disease (coaraction of aorta, ASD/ostrium secundum, VSD) for which she had two surgeries has a child. S/p pacemaker (permament) and cardiomems device placement. HFrEF (45-50% in 8/2018), atrial fibrillation on Eliquis. She is making minimal amounts of urine. She is currently having her second episode of peritonitis. Her sister is interested in donating.            Kidney Disease Hx:        Kidney Disease Dx: likely CRS       Biopsy Proven: No         On Dialysis: Yes, Date initiated: 1/2019  and Dialysis Type: PD;       Primary Nephrologist: Dr. Bustos       H/o Kidney Stones: No       H/o Recurrent/Frequent UTI: No         Diabetic Hx: Type 2        Diagnosis Date: during 10/2019 admit she required some  interimittent insulin.        Medication History: not on medication at this time        Diabetic Control: Controlled (HbA1c <7%)   Last HbA1c: 6.1% (10/2019)       Hypoglycemic Unawareness: No       End-Organ Damage due to DM: None          Cardiac/Vascular Disease Risk Factors:        Cardiac Risk Factors: Diabetes, Hypertension, CKD and Age (Male > 55, Female > 65)       Known CAD: No       Known PAD/Caludication Symptoms: No       Known Heart Failure: Decreased LVEF       Arrhythmia: Yes; atrial fibrillation        Pulmonary Hypertension: No        Valvular Disease: Yes; mild aortic stenosis        Other: Hypotension, no longer needing  midodrine         Volume Status/Weight:        Volume status: Not assessed       Weight:  Acceptable BMI       BMI: Body mass index is 25.54 kg/m .         Functional Capacity/Frailty:        Able to walk one hour at the stores, was going to do cardiac rehab since       Fatigue/Decreased Energy: [x] No [] Yes    Chest Pain or SOB with Exertion: [x] No [] Yes    Significant Weight Change: [x] No [] Yes    Nausea, Vomiting or Diarrhea: [x] No [] Yes    Fever, Sweats or Chills:  [x] No [] Yes    Leg Swelling [x] No [] Yes        History of Cancer: None    Other Significant Medical Issues:   - Compensated liver cirrhosis from CHF, with a transjugular liver biopsy on 12/2019 that showed venous outflow obstruction and bridging fibrosis with a mean portosystemic gradient of 6 mmHg. EGD in 10/2019 showed no varices. LFTS wnls, platelet counts ranging 160-200 K .    - GI bleeding in January 2020, initially presumed ischemia colitis d/t hypotension. She had ongoing bleeding that was later thought to be from supratherapeutic INR.  CT and scoping both were negative. Anticoagulation was changed to Eliquis. No bleeding recurrence.   - Asymptomatic hypotension, stable with SBPs ranging 103-109 mmHg as of late. She has not required midodrine since last summer. EF 45-50%.     Review of Systems:  A comprehensive review of systems was obtained and negative, except as noted in the HPI or PMH.    Past Medical History:   Medical record was reviewed and PMH was discussed with patient and noted below.  Past Medical History:   Diagnosis Date     Anemia in chronic kidney disease      Anticoagulated      Atrial fibrillation (H)      Cirrhosis of liver (H)      Congenital heart disease      Congestive heart failure (H)      End stage kidney disease (H)      ESRD (end stage renal disease) (H)      ESRD on dialysis (H) 4/23/2020     GERD (gastroesophageal reflux disease)      GI bleeding      Heart disease      History of blood transfusion       Hypotension      Hypothyroid      Vitamin D deficiency        Past Social History:   Past Surgical History:   Procedure Laterality Date     CARDIAC SURGERY       COLONOSCOPY      2020     CORONARY ANGIOGRAPHY [place]ULT ORDER      [place]      ENT SURGERY       GI SURGERY      [place]      IMPLANT PACEMAKER      [place] 10/5/1999     VASCULAR SURGERY       Personal history of bleeding or anesthesia problems: Yes; GI bleeding hisotry detailed above.     Family History:  Family History   Problem Relation Age of Onset     Dementia Mother      Heart Disease Mother      Diabetes Type 2  Mother      Cancer Father      Heart Disease Father      Diabetes Type 2  Sister      Coronary Artery Disease Brother      Diabetes Type 2  Brother        Personal History:   Social History     Socioeconomic History     Marital status:      Spouse name: Not on file     Number of children: Not on file     Years of education: Not on file     Highest education level: Not on file   Occupational History     Not on file   Social Needs     Financial resource strain: Not on file     Food insecurity     Worry: Not on file     Inability: Not on file     Transportation needs     Medical: Not on file     Non-medical: Not on file   Tobacco Use     Smoking status: Never Smoker     Smokeless tobacco: Never Used   Substance and Sexual Activity     Alcohol use: Not Currently     Drug use: Never     Sexual activity: Not on file   Lifestyle     Physical activity     Days per week: Not on file     Minutes per session: Not on file     Stress: Not on file   Relationships     Social connections     Talks on phone: Not on file     Gets together: Not on file     Attends Alevism service: Not on file     Active member of club or organization: Not on file     Attends meetings of clubs or organizations: Not on file     Relationship status: Not on file     Intimate partner violence     Fear of current or ex partner: Not on file     Emotionally abused: Not on file      "Physically abused: Not on file     Forced sexual activity: Not on file   Other Topics Concern     Parent/sibling w/ CABG, MI or angioplasty before 65F 55M? Not Asked   Social History Narrative     Not on file       Allergies:  Allergies   Allergen Reactions     Diatrizoate Hives     Patient reacted after a CT of her neck at [facility] on 2/24/2017. She reports she was given 80 mg of Visitaque per the technicians.  Patient reacted after a CT of her neck at [facility] on 2/24/2017. She reports she was given 80 mg of Visitaque per the technicians.; HUT Comment: Patient reacted after a CT of her neck at [facility] on 2/24/2017. She reports she was given 80 mg of Visitaque per the technicians.; HUT Reaction: Hives; HUT Severity: High; HUT Noted: 20170227     Amlodipine Rash     HUT Reaction: Rash; HUT Noted: 20170613       Medications:  Current Outpatient Medications   Medication Sig     multivitamin RENAL (NEPHROCAPS/TRIPHROCAPS) 1 MG capsule Take 1 capsule by mouth daily     pantoprazole (PROTONIX) 20 MG EC tablet Take 20 mg by mouth     sevelamer (RENVELA) 800 MG tablet Take 800 mg by mouth     vitamin D3 (CHOLECALCIFEROL) 2000 units (50 mcg) tablet Take 2,000 Units by mouth     ELIQUIS ANTICOAGULANT 5 MG tablet Take 5 mg by mouth 2 times daily     Ferrous Sulfate (IRON) 325 (65 FE) MG tablet      IRON PO Take 325 mg by mouth     levothyroxine (SYNTHROID) 75 MCG tablet      potassium bicarbonate (K-VESCENT) 25 MEQ solu-tab Take 25 mEq by mouth     potassium chloride (KLOR-CON) 20 MEQ Packet      No current facility-administered medications for this visit.        Vitals:  Ht 1.727 m (5' 8\")   Wt 76.2 kg (168 lb)   BMI 25.54 kg/m      Exam:  GENERAL: Healthy, alert and no distress  EYES: Eyes grossly normal to inspection.  No discharge or erythema, or obvious scleral/conjunctival abnormalities.  RESP: No audible wheeze, cough, or visible cyanosis.  No visible retractions or increased work of " breathing.    SKIN: Visible skin clear. No significant rash, abnormal pigmentation or lesions.  NEURO: Cranial nerves grossly intact.  Mentation and speech appropriate for age.  PSYCH: Mentation appears normal, affect normal/bright, judgement and insight intact, normal speech and appearance well-groomed.    Results:   No results found for this or any previous visit (from the past 336 hour(s)).

## 2020-05-12 ENCOUNTER — DOCUMENTATION ONLY (OUTPATIENT)
Dept: TRANSPLANT | Facility: CLINIC | Age: 65
End: 2020-05-12

## 2020-05-12 NOTE — PROGRESS NOTES
"Kidney Transplant Referral - 3/11/2020  Kaylah A Denise attended the pre-transplant patient education class today. The [facility] website pre-transplant modules were viewed; class participants were educated on using the site.     Content reviewed:    Living Donation and how to access that program    Paired exchange    Kidney Donor Profile Index (KDPI)    Waiting list issues (right to decline without penalty, high PHS risk donors, what to expect when called with an offer)    Hospital experience,  length of stay , need to stay locally post-discharge (2-4 weeks)    Surgical options (with pictures)                             Post-surgery lifting and driving restrictions    Post-transplant routines, frequency of lab work and clinic visits    Need to stay locally post-discharge (2-4 weeks)    Role of Transplant Coordinator    Participants were informed of the benefits of transplant as well as potential risks such as infection, cancer, and death.  The need for total adherence with immunosuppression medications and following transplant regimens was stressed.  The overall evaluation/approval/listing process was reviewed.        The patient was provided with the following documents:  What You Need to Know About a Kidney Transplant  Adult Kidney Transplant - A Guide for Patients  SRTR Data Sheet - Kidney  Brochure - Kidney Allocation  Brochure - Multiple Listing and Waiting Time Transfer  What Every Patient Needs to Know (UNOS)  UNOS Facts and Figures  Finding a Donor  [facility] - Quick Start Guide  KDPI Consent    Kaylah A Denise signed the  Receipt of Information for Organ Transplant Recipient.\" She was provided Caity Weston's business card and instructed to call with additional questions.        "

## 2020-05-13 ENCOUNTER — PATIENT OUTREACH (OUTPATIENT)
Dept: TRANSPLANT | Facility: CLINIC | Age: 65
End: 2020-05-13
Attending: PHYSICIAN ASSISTANT
Payer: MEDICARE

## 2020-05-13 ENCOUNTER — DOCUMENTATION ONLY (OUTPATIENT)
Dept: TRANSPLANT | Facility: CLINIC | Age: 65
End: 2020-05-13

## 2020-05-13 VITALS — BODY MASS INDEX: 25.46 KG/M2 | WEIGHT: 168 LBS | HEIGHT: 68 IN

## 2020-05-13 DIAGNOSIS — Q24.9: ICD-10-CM

## 2020-05-13 DIAGNOSIS — Z76.82 ORGAN TRANSPLANT CANDIDATE: ICD-10-CM

## 2020-05-13 DIAGNOSIS — N18.6 ESRD (END STAGE RENAL DISEASE) (H): ICD-10-CM

## 2020-05-13 DIAGNOSIS — Z01.818 PRE-TRANSPLANT EVALUATION FOR KIDNEY TRANSPLANT: ICD-10-CM

## 2020-05-13 DIAGNOSIS — Z79.01 ANTICOAGULATED: ICD-10-CM

## 2020-05-13 DIAGNOSIS — N18.6 END STAGE RENAL DISEASE (H): ICD-10-CM

## 2020-05-13 DIAGNOSIS — Q24.9 CONGENITAL HEART DISEASE: ICD-10-CM

## 2020-05-13 DIAGNOSIS — E55.9 VITAMIN D DEFICIENCY: ICD-10-CM

## 2020-05-13 DIAGNOSIS — N18.6 END STAGE RENAL DISEASE (H): Primary | ICD-10-CM

## 2020-05-13 PROBLEM — R06.89 DIFFICULTY BREATHING: Status: RESOLVED | Noted: 2017-02-03 | Resolved: 2020-05-13

## 2020-05-13 RX ORDER — SEVELAMER CARBONATE 800 MG/1
800 TABLET, FILM COATED ORAL
COMMUNITY
Start: 2019-05-21

## 2020-05-13 RX ORDER — PANTOPRAZOLE SODIUM 20 MG/1
20 TABLET, DELAYED RELEASE ORAL
COMMUNITY

## 2020-05-13 RX ORDER — APIXABAN 5 MG/1
5 TABLET, FILM COATED ORAL 2 TIMES DAILY
COMMUNITY
Start: 2020-03-17

## 2020-05-13 RX ORDER — CHOLECALCIFEROL (VITAMIN D3) 50 MCG
2000 TABLET ORAL
COMMUNITY
Start: 2018-06-01

## 2020-05-13 ASSESSMENT — MIFFLIN-ST. JEOR: SCORE: 1365.54

## 2020-05-13 NOTE — PROGRESS NOTES
"Kaylah Walker is a 65 year old female who is being evaluated via a billable video visit.      The patient has been notified of following:     \"This video visit will be conducted via a call between you and your physician/provider. We have found that certain health care needs can be provided without the need for an in-person physical exam.  This service lets us provide the care you need with a video conversation.  If a prescription is necessary we can send it directly to your pharmacy.  If lab work is needed we can place an order for that and you can then stop by our lab to have the test done at a later time.    Video visits are billed at different rates depending on your insurance coverage.  Please reach out to your insurance provider with any questions.    If during the course of the call the physician/provider feels a video visit is not appropriate, you will not be charged for this service.\"    Patient has given verbal consent for Video visit? Yes    How would you like to obtain your AVS? E-Mail (inform patient AVS not encrypted)    Patient would like the video invitation sent by: Send to e-mail at: [email or web address]    Will anyone else be joining your video visit? Yes: Serenity daughter. How would they like to receive their invitation? Text to cell phone: [phone]        "

## 2020-05-13 NOTE — PROGRESS NOTES
"Kaylah Walker is a 65 year old female who is being evaluated via a billable video visit.      The patient has been notified of following:     \"This video visit will be conducted via a call between you and your physician/provider. We have found that certain health care needs can be provided without the need for an in-person physical exam.  This service lets us provide the care you need with a video conversation.  If a prescription is necessary we can send it directly to your pharmacy.  If lab work is needed we can place an order for that and you can then stop by our lab to have the test done at a later time.    Video visits are billed at different rates depending on your insurance coverage.  Please reach out to your insurance provider with any questions.    If during the course of the call the physician/provider feels a video visit is not appropriate, you will not be charged for this service.\"    Patient has given verbal consent for Video visit? Yes    How would you like to obtain your AVS? E-Mail (inform patient AVS not encrypted)    Patient would like the video invitation sent by: Send to e-mail at: [email or web address]    Will anyone else be joining your video visit? Yes: syeda [phone]. How would they like to receive their invitation? Text to cell phone: [phone]  {If patient encounters technical issues they should call [phone] :[id number]}      Video-Visit Details    Type of service:  Video Visit    Video Start Time: {video visit start/end time:152948}  Video End Time: {video visit start/end time:152948}    Originating Location (pt. Location): {video visit patient location:987498::\"Home\"}    Distant Location (provider location):  [employer] [facility]     Platform used for Video Visit: {Virtual Visit Platforms:912923::\"[employer]\"}    {signature options:103749}      "

## 2020-05-13 NOTE — PROGRESS NOTES
Summary    Team s concerns/comments:   - Cardiology   - Recommend resuming cardiac rehab once covid safe   - Request pap smear records from [facility] in [facility]   - She has some dental work pending  .   Candidacy category: YELLOW    Action/Plan: Continue evaluation     Expected Selection Meeting Discussion: 05/20/2020

## 2020-05-13 NOTE — PROGRESS NOTES
"Kaylah Walker is a 65 year old female who is being evaluated via a billable video visit.      The patient has been notified of following:     \"This video visit will be conducted via a call between you and your physician/provider. We have found that certain health care needs can be provided without the need for an in-person physical exam.  This service lets us provide the care you need with a video conversation.  If a prescription is necessary we can send it directly to your pharmacy.  If lab work is needed we can place an order for that and you can then stop by our lab to have the test done at a later time.    Video visits are billed at different rates depending on your insurance coverage.  Please reach out to your insurance provider with any questions.    If during the course of the call the physician/provider feels a video visit is not appropriate, you will not be charged for this service.\"    Patient has given verbal consent for Video visit? Yes    How would you like to obtain your AVS? E-Mail (inform patient AVS not encrypted)    Patient would like the video invitation sent by: Send to e-mail at: [email or web address]    Will anyone else be joining your video visit? Yes: syeda [phone]. How would they like to receive their invitation? Text to cell phone: [phone]        Video-Visit Details    Type of service:  Video Visit    Video Start Time:1030  Video End Time: 11:45 AM    Originating Location (pt. Location): Home    Distant Location (provider location):  [facility]     Platform used for Video Visit: Adriana Amaya MD    Patient was seen by myself, Dr. Nathan Amaya, in conjunction with Mary Jane Maurer, as part of a shared visit.    I personally reviewed past medical and surgical history, vital signs, medications and labs.  Present and past medical history, along with significant physical exam findings were all reviewed with HUDSON.    My key findings:  Kaylah JARQUIN" Denise is a 65 year old year old female with ESKD from cardiorenal, who presents for Kidney transplant evaluation.    Patient with congenital coarctation of the aorta as well VSD and ASD and underwent repair with now placement of ICD and has cardiomems in place. EF 45 percent. No prior angiogram and no recent stress test.     ESRD started PD in 2019. Going ok. Peritonitis x 2.     GI bleed in fall . Has cirrhosis thought due to heart failure. Biopsy of liver was done with bridging fibrosis and venous pressures high.     Ca screens: colonoscopy up to date. mammo up to date, pap up to date. Dental needed.     No cp, sob, no n/v/d, no f/s/c.    Key management decisions made by me and discussed with HUDSON:  1. Kidney transplant evaluation - patient is a fair candidate overall. Benefits of a living donor transplant were discussed.  The patient may have potential living donors.  She is currently on dialysis and will complete her work-up as below and once complete she will be listed as active on the  donor list.  2. ESKD from cardiorenal: The patient has end-stage kidney disease thought due to cardiorenal syndrome although no prior biopsy.  She continues on peritoneal dialysis and she no longer has hypotension symptoms and is now off Midodrine  3. Hx of anatomic heart disease with reduced EF: The patient has a history of ASD and VSD repair as well as surgical repair of her coarctation of the aorta.  The patient had an ejection fraction most recently 45% and will need an updated echo.  If she continues to have a reduced ejection fraction she may benefit from rule out of coronary artery disease with potential left heart cath.  The patient will be evaluated by cardiology.  4. Cirrhosis: The patient has a history of cirrhosis that is thought due to increased venous pressure in the setting of her anatomic heart disease.  She did have a liver biopsy that showed bridging cirrhosis but has no varices and normal  platelets on her most recent evaluation.  She has had no recent paracenteses and has had no problems with hepatic encephalopathy.  She will continue to need to follow with hepatology for hepatocellular carcinoma screening.  5. Need for CAD screen: The patient has multiple risk factors for coronary artery disease and will need a cardiology evaluation.  6. Ca screens: The patient is up-to-date on her colonoscopy, mammogram, and Pap smear.  The patient is due for dental evaluation.  7. Anticoagulation: The patient is currently on anticoagulation with apixaban and this will need to be switched to Coumadin to be active on the  donor list.  If she does have a live donor she could continue on apixaban until she was a few weeks prior to her scheduled date and then switched to Coumadin in anticipation of her live kidney donor transplant.  8. Hypotension: improved on PD. 103-109 systolically now off midodrine.     The combined time for this visit between the advanced practice provider and myself was 60 minutes of which > 50% of time was spent counseling regarding the options for replacing kidney function. All questions were answered.      Approximately 31 minutes of non face-to-face time were spent in review of the patient's medical record to date.  This included review of previous: clinic visits, hospital records, lab results, imaging studies, and procedural documentation.  The findings from this review are summarized in the above note.

## 2020-05-13 NOTE — PROGRESS NOTES
"Kaylah Walker is a 65 year old female who is being evaluated via a billable telephone visit.      The patient has been notified of following:     \"This telephone visit will be conducted via a call between you and your physician/provider. We have found that certain health care needs can be provided without the need for a physical exam.  This service lets us provide the care you need with a short phone conversation.  If a prescription is necessary we can send it directly to your pharmacy.  If lab work is needed we can place an order for that and you can then stop by our lab to have the test done at a later time.    Telephone visits are billed at different rates depending on your insurance coverage. During this emergency period, for some insurers they may be billed the same as an in-person visit.  Please reach out to your insurance provider with any questions.    If during the course of the call the physician/provider feels a telephone visit is not appropriate, you will not be charged for this service.\"    Patient has given verbal consent for Telephone visit? yes    Phone call duration: 15 minutes    Outpatient MNT: Kidney Transplant Evaluation    Current BMI: 25.9 (HT 68 in,  lbs/77 kg)- data from 3/25   BMI is within recommendation of <35 for kidney transplant     Time Spent: 15 minutes  Visit Type: Initial  Referring Physician: Pito  Pt accompanied by: self    Medical dx associated with RD referral  - ESRD    History of previous txp: none  Dialysis: yes    Dialysis Modality: PD  Days/Times: daily  Dialysis Start: started on dialysis 1/2019, but transitioned to PD 9/2019  Pt receives protein supplement with dialysis: N    Nutrition Assessment  Pt reports following a low sodium diet for majority of her life d/t cardiac issues.     Appetite: overall good/baseline, but has 3-4 days out of a month that appetite is typically very poor (due to hospitalizations or other medical issues)    Vitamins, Supplements, " Pertinent Meds: vit D, kidney MVI, phos binder   Herbal Medicines/Supplements: none     Diet Recall  Breakfast Cereal and toast with PB or bagel    Lunch Take out breakfast s/w or ham and cheese s/w    Dinner Lasagna with garlic toast or cod s/w with coleslaw (take out)   Snacks Crackers, tortilla chips, pretzels, cookies, fresh veggies    Beverages Water, hot tea, lemonade, some fresca (less often Dr Pepper or Pepsi), some cranberry or apple juice, minimal milk (usually only in cereal)   Alcohol None    Dining out A few times/week (more recently d/t COVID)     Physical Activity  Was about to start cardiac rehab, but this has been put on hold d/t COVID     Anthropometrics  Height:   68 in   BMI:    25.9    Weight Status:Overweight BMI 25-29.9   Weight:  170 lbs (3/25)            IBW (lb): 140  % IBW: 121    Wt Hx: Pt reports no edema and overall stable weight with exception of fluid loss/diuresis when she started dialysis.     Adj/dosing BW: 148 lbs/67 kg       Labs  4/3/20  K 4.1 with h/o wnl levels  Phos 4.4 with h/o some high levels     Malnutrition  % Intake: No decreased intake noted  % Weight Loss: None noted  Subcutaneous Fat Loss: None  Muscle Loss: None  Fluid Accumulation/Edema: None noted  Malnutrition Diagnosis: Patient does not meet two of the above criteria necessary for diagnosing malnutrition     Estimated Nutrition Needs  Energy  7996-5727     (25-30 kcal/kg for maintenance)     Protein  80-94    (1.2-1.4 g/kg for HD/PD needs)         Fluid  1 ml/kcal or per MD   Micronutrient   Na+: <2000 mg/day  K+: 0527-0006 mg/day  Phos: 800-1000 mg/day            Nutrition Diagnosis  Food and nutrition related knowledge deficit r/t pre kidney transplant eval AEB pt verbalized not hearing pre/post transplant diet guidelines.    Nutrition Intervention  Nutrition education provided:  Discussed sodium intake (low sodium foods and drinks, seasoning food without salt and tips for low sodium diet). Pt may be taking in  more sodium than she thinks due to more frequent dining out and somewhat salty snacks. Reviewed wnl K/Phos labs and need for adequate protein with dialysis.     Reviewed post txp diet guidelines in brief (will review in further detail post txp):  (1) Review of proper food safety measures d/t immunosuppressant therapy post-op and increased risk for food-borne illness    (2) Avoid the following post txp d/t risk for rejection, unknown effects on the organs, and/or potential interactions with immunosuppressants:  - Herbal, Chinese, holistic, chiropractic, natural, alternative medicines and supplements  - Detoxes and cleanses  - Weight loss pills  - Protein powders or other products with extracts or herbs (ie green tea extract)    (3) Med regimen and possible side effects    Patient Understanding: Pt verbalized understanding of education provided.  Expected Compliance: Good  Follow-Up Plans: PRN     Nutrition Goals  1. Limit Na+ <2000mg/day  2. Pt to verbalize understanding of 3 aspects of post txp education provided    Provided pt with contact info.   Sheyla Dee RD, LD  [facility] [phone]

## 2020-05-13 NOTE — PROGRESS NOTES
Psychosocial Assessment  Patient Name/ Age: Kaylah Walker 65 year old   Medical Record #: [id number]  Duration of Interview:     30  min  Process:  Telephone Interview                (counseling < 50%)   Present at Appointment: Margaret and her daughter Giana        :JOHAN Reyna, [facility] Date:  May 13, 2020        Type of transplant: Kidney    Donor type:   Margaret indicated her sister has expressed interest in being a donor.   Cadaver and sister   Prior Transplants:    No Status of Transplant:       Current Living Situation    Location:   [address] [place] [address]  With Whom:  Love       Family/ Social Support:    Giana (28) lives in [place], [place].  Margaret and Love have another daughter Serenity (33) lives in [place], [place].  They have two grandchildren.  Margaret's mother, one brother and one sister lives in [place], [place].  She has another sister who lives in [place], [place].    Available, helpful   Committed relationship:  Margaret and Love have been  for 38 years.   Stable/supportive   Other supports:   Friends Available, helpful       Activities/ Functional Ability    Current level:  Margaret wears corrective lenses. Ambulatory, visually impaired and independent with ADL's     Transportation Drives self       Vocational/Employment/Financial     Employment   Disabled   Job Description      Income  Margaret indicated she became eligible for SSDI in September 2019.  Pat is employed full time.   SSDI   Insurance  BC/BS through [facility]'s insurance and Medicare Parts A and B.    At this time, patient can afford medication costs:  Yes  Private Insurance and Medicare       Medical Status    Current mode of treatment for ESRD Dialysis   Complications - Non diabetic        Behavioral    Tobacco Use No Chemical Dependency No       Psychiatric Impairment No    Reading ability Good  Education Level: Technical College Recent Legal History No    Coping Style/Strategies: Margaret indicated when under stress she will talk  to herself or scream in shower or outside.   Ability to Adhere to Complex Medical Regime: Yes     Adherence History:  Margaret indicated she will usually follow her physician's recommendations.        Education  _X_ Medicare  _X_ Rehabilitation  _X_ Donor issues  _X_ Community resources  _X_ Post discharge housing  _X_ Financial resources  _X_ Medical insurance options  _X_ Psych adjustment  _X_ Family adjustment  _X_ Health Care Directive - Yes, Will Provide   Psychosocial Risks of Transplant Reviewed and Discussed:  _X_ Increased stress related to emotional,            family, social, employment or financial           situation  _X_ Affect on work and/or disability benefits  _X_ Affect on future life insurance  _X_ Transplant outcome expectations may           not be met  _X_ Mental Health Risks: anxiety,           depression, PTSD, guilt, grief and           chronic fatigue     Notable Items:   None noted.       Final Evaluation/Assessment   Patient seemed to process information well. Appeared well informed, motivated and able to follow post transplant requirements. Behavior was appropriate during interview. Has adequate income and insurance coverage. Adequate social support. No major contraindications noted for transplant.  At this time patient appears to understand the risks and benefits of transplant.      Recommendation  Acceptable    Selection Criteria Met:  Plan for support Yes   Chemical Dependence Yes   Smoking Yes   Mental Health Yes   Adequate Finances Yes    Signature: JOHAN Reyna, [facility]   Title: Clinical

## 2020-05-14 NOTE — PROGRESS NOTES
"Kaylah Walker is a 65 year old female who is being evaluated via a billable telephone visit.      The patient has been notified of following:     \"This telephone visit will be conducted via a call between you and your physician/provider. We have found that certain health care needs can be provided without the need for a physical exam.  This service lets us provide the care you need with a short phone conversation.  If a prescription is necessary we can send it directly to your pharmacy.  If lab work is needed we can place an order for that and you can then stop by our lab to have the test done at a later time.    Telephone visits are billed at different rates depending on your insurance coverage. During this emergency period, for some insurers they may be billed the same as an in-person visit.  Please reach out to your insurance provider with any questions.    If during the course of the call the physician/provider feels a telephone visit is not appropriate, you will not be charged for this service.\"    Patient has given verbal consent for Telephone visit? YES    What phone number would you like to be contacted at? [phone]     How would you like to obtain your AVS? EMAIL    Phone call duration: 51 minutes    RE: Kaylah Walker    [facility]# [id number]        I saw your patient, Kaylah Walker, in consultation in our pretransplant clinic.  She presented via phone call with her daughter to discuss care for her end-stage renal disease.       We talked about the pros and cons of transplantation vs. dialysis.  We discussed the fact that it was important that she think about the pros and cons of each treatment option and make an active decision.  We also discussed the fact that the two were interconnected and she may need to go on dialysis before transplant (if she chose to have a transplant) and that if the transplant failed, she might need dialysis before another transplant.       We also discussed the " "fact that if she chose to have a transplant, she would need to decide between going on the wait list for a  donor transplant vs. having a living donor transplant.  We talked about the pros and cons of each option.  Although I didn't express an opinion regarding transplantation or dialysis, I suggested that if she chose to have a transplant, a living donor transplant would be preferable in that the surgery is the same, the immunosuppressive drugs and the risks are the same, but the transplant could be done sooner and the results are better.  I told her that the wait for  donor kidney was approximately five years for patients who are newly put on the waiting list.  In addition, we talked about the fact that the disadvantage of a living donor transplant was the risk to the donor.       Because she was interested in living donation, we spent some time discussing donor risks, including the risks of mortality, morbidity, and long-term risks with a single kidney. We will be sending her a donor video in the mail to view and share at her leisure.     They also had many questions about comparing transplant centers, waiting time, going out of the region for a transplant, etc. I discussed this with them and referred them to the OPTN website.  They asked about participating in research studies and I explained our protocols for informed consent      Finally, we discussed the new ( donor) kidney (KDPI) scoring system with her.  We discussed the advantages and disadvantages of accepting an \"expanded criteria\" donor kidney, and the latest data as to who is potentially benefited by receiving an expanded criteria donor kidney versus waiting longer for a standard criteria donor.     I attempted to answer any remaining questions.  I also told her that should she have any questions, she should feel free to contact us.  We would be glad to answer any questions either over the phone or at another clinic visit.  Her " transplant coordinator is Caity Weston and may be reached at [phone].  Thank you for the opportunity to see her.     I spent 51 minutes with this patient.  Over 90% of that time was spent in counseling and coordination of care.             Yours truly,               Vin Sheldon MD         Professor of Surgery         ([phone])    SHEKHAR/

## 2020-05-22 ENCOUNTER — TELEPHONE (OUTPATIENT)
Dept: TRANSPLANT | Facility: CLINIC | Age: 65
End: 2020-05-22

## 2020-05-22 NOTE — TELEPHONE ENCOUNTER
Contacted patient to review outcome of selection committee meeting (See selection committee encounter).   Explained to patient that he/she needs to complete all components of the evaluation to be eligible for active status on the waiting list or to proceed with a live donor kidney transplant.   Reviewed next steps based on outcomes:  I will call Dr. Yovani Frazier to share recommendation for a left heart catheterization. Pt will call and make a lab appointment at our Clinic. Pt to have live donors register now online to initiate their evaluations.   Patient will not be listed (patient is on dialysis and evaluation is not complete), patient will receive:    - An Evaluation Summary Letter indicating what is needed to complete evaluation-discussed with patient if they would like to have testing done with [facility] or locally  Confirmed with patient that on successful completion of outstanding components, patient is eligible for active status and they will receive a follow-up call.   Confirmed that patient has contact information for additional questions or concerns. Pt expressed very good understanding of all and was in good agreement with the plan.   Generated Transplant Evaluation Summary Letter today in Epic - routed to admin for mailing.

## 2020-05-22 NOTE — LETTER
05/22/20        Kaylah Walker  [address] [place] [address]        Dear Kaylah,    It was a pleasure to see you recently for consideration of kidney transplantation. Your pre-transplant evaluation results were reviewed at our Multidisciplinary Selection Committee on 05/20/2020. The Committee is requesting the following items are completed before determining your candidacy:    1. Cardiology evaluation to include risk assessment, left heart catheterization and recommendation to proceed with kidney transplant surgery. I will call Dr. Yovani Frazier to arrange for this.   2. Pre-kidney transplant lab work. Please call our Clinic Lab to make this appointment at [phone]. Please call me to let me know when you have completed this.   3. Transplant Coordinator review over the phone of [facility] patient education as well as the consent forms you have received in your email. Will plan to complete this when your lab work is done.   4. PAP smear results done in 2020 have been requested by our Office from [facility].   5. Mammogram results need to be available when completed. Please schedule this at your regular clinic and arrange to have the results faxed to our Office at [phone].  6. Dental work needs to be up to date. Please work with your dentist office to complete this.    7. Note, your current anticoagulation with apixaban will need to be changed to coumadin when listed on ACTIVE status or when planning to schedule a live donor kidney transplant.     Upon successful completion of items # 2 and 3 you will be eligible to be listed on the kidney transplant waitlist on INACTIVE status until the remainder of items are completed.    Upon successful completion of all the above items, your results will be reviewed at the Multidisciplinary Selection Committee for approval. When approved you will be eligible to be added onto the kidney transplant wait list on ACTIVE status or to proceed with a live  kidney donor transplant.     Please have any potential live donors register now online with our Program to initiate their evaluations at [email or web address]. You will be notified by our Office in the event of an approved live donor.     You have not been added onto the kidney transplant waitlist at this time because you are already on dialysis. When added onto the kidney transplant wait list in the future, your wait time will start with the start date of your chronic dialysis which is  01/16/2019.    For any questions, please contact myself at the Transplant Office at [phone].        Sincerely,      Caity Weston RN BSN Transplant Coordinator   Solid Organ Transplant  [facility], [facility]    Enclosure: UNOS Letter     CC's: Care Team

## 2020-06-01 ENCOUNTER — TELEPHONE (OUTPATIENT)
Dept: TRANSPLANT | Facility: CLINIC | Age: 65
End: 2020-06-01

## 2020-06-01 NOTE — TELEPHONE ENCOUNTER
Called Dr. Yovani Trevino ( cardiologist at [facility] ) today and spoke with his nurse. Shared that Transplant Nephrologist/ Transplant Surgeon are recommending pt completes a left heart cath as part of her pre kidney transplant evaluation. I explained pt is already aware of this recommendation and that I will be calling Dr. Trevino to request this. Also explained we determined that pt's liver status is okay to proceed with kidney transplant surgery. Nurse will pass on to Dr. Trevino.

## 2020-06-12 ENCOUNTER — TRANSFERRED RECORDS (OUTPATIENT)
Dept: HEALTH INFORMATION MANAGEMENT | Facility: CLINIC | Age: 65
End: 2020-06-12

## 2020-07-02 ENCOUNTER — TRANSFERRED RECORDS (OUTPATIENT)
Dept: HEALTH INFORMATION MANAGEMENT | Facility: CLINIC | Age: 65
End: 2020-07-02

## 2020-07-21 ENCOUNTER — TELEPHONE (OUTPATIENT)
Dept: TRANSPLANT | Facility: CLINIC | Age: 65
End: 2020-07-21

## 2020-07-21 NOTE — TELEPHONE ENCOUNTER
Noted pt had left heart cath last week at [facility], normal coronary arteries.     I called pt today and explained I see her angio results from last week and also have her PAP result. Pt stating her dental work is done as well as mammogram and she will have records faxed to our Office. I also instructed her to have her lab work done for pre transplant - pt to call and schedule appt. Pt stating her email with [employer] forms has , told her I will have a new email sent to her. Explained I can review results of angio at our Selection Committee for approval.. Pt confirmed she is also attending a eval at [facility] because she has a potential live donor that is more comfortable there then coming to our Center. Told her this is fine. Pt expressed very good understanding of all and was in good agreement with the plan.     Called Cardiology Clinic today at [facility] and left a message for Dr. Trevino to write a risk assessment and recommendation for patient to proceed with kidney transplant surgery.

## 2020-07-22 ENCOUNTER — MEDICAL CORRESPONDENCE (OUTPATIENT)
Dept: HEALTH INFORMATION MANAGEMENT | Facility: CLINIC | Age: 65
End: 2020-07-22

## 2020-08-05 NOTE — TELEPHONE ENCOUNTER
Provider Call: General  Route to LPN    Reason for call:  From [facility]- Dr Frazier signed a report and is available in Care everywhere  If unable to find it call them back     Call back needed? Yno

## 2020-12-27 ENCOUNTER — HEALTH MAINTENANCE LETTER (OUTPATIENT)
Age: 65
End: 2020-12-27

## 2021-04-28 ENCOUNTER — TELEPHONE (OUTPATIENT)
Dept: TRANSPLANT | Facility: CLINIC | Age: 66
End: 2021-04-28

## 2021-04-28 NOTE — TELEPHONE ENCOUNTER
Contacted pt to see if she is being evaluated at the [facility] as she indicated in July 2020.  She states that she indeed is being evaluated for a living donor kidney at the [facility] mostly because her donor is most familiar with [facility] and feels more comfortable going there.  She states she will know by August 2021 if this transplant will happen.  She states that we do not need to contact her again.  She states that she understands that should this transplant not occur at [facility] that she is welcome to contact the [facility] again to be evaluated here.   .

## 2021-05-05 ENCOUNTER — COMMITTEE REVIEW (OUTPATIENT)
Dept: TRANSPLANT | Facility: CLINIC | Age: 66
End: 2021-05-05

## 2021-05-05 NOTE — COMMITTEE REVIEW
Abdominal Committee Review Note     Evaluation Date: 5/13/2020  Committee Review Date: 5/5/2021    Organ being evaluated for: Kidney    Transplant Phase: Evaluation  Transplant Status: Active    Transplant Coordinator: Caity Weston  Transplant Surgeon: Dr. Vin Sheldon     Referring Physician: Referred Self    Primary Diagnosis: ESRD on dialysis   Secondary Diagnosis:     Committee Review Members:  Nephrology Sunny Almazan MD, Jonathon Augustin MD   Pharmacy Fili Barney, [facility]    - Clinical Hannah Guzman, [facility]   Transplant Carrie Braden RN, Con Olivarez MD, Radha Jones, [facility], Miryam Ashford, NP, Candy Han, RN, Caity Weston RN, Joanna Marshall, RN   Transplant Surgery Brenda Gonsalez MD, MD       Transplant Eligibility: Irreversible chronic kidney disease treated w/dialysis or expected need for dialysis    Committee Review Decision: Declined    Relative Contraindications: None    Absolute Contraindications: None    Committee Chair Con Olivarez MD verbally attested to the committee's decision.    Committee Discussion Details: Reviewed pt has chosen to go to another transplant center instead of ours. Determination made to end pre kidney eval at our Center. Determination made to end this eval.

## 2021-05-06 NOTE — LETTER
May 6, 2021    Kaylah BRITTON Denise  [id number] [place] [address]      Dear Ms. Walker,   The purpose of this letter is to let you know that on 05/04/2021 the [facility] Multi-Disciplinary Selection Team reviewed the results of your transplant evaluation.  Based on your wishes to go to a different transplant center, the decision was made to not list you on the kidney transplant list.  This is because you are pursuing transplant at another center. You are free to return to our center at any time if you choose.   Important things you should know:    If you would like to discuss the decision, or if your medical status changes you may schedule a return visits with your doctor by calling [phone] and asking to speak to your transplant coordinator.    We recommend that you continue to follow up with your primary care doctor in order to manage your health concerns.  Enclosed is a letter from [employer] which describes the services offered to patients by [facility] and the Organ Procurement and Transplantation Network.  Thank you for allowing us to participate in your care.  We wish you well.  Sincerely,      Solid Organ Transplant  [facility]    Enclosures: OS Letter  cc: Dr. Erica Nelson, Dr. Sumit Bustos, Dr. Yovani Trevino, [facility].             The Organ Procurement and Transplantation Network  Toll-free patient services line:     Your resource for organ transplant information    If you have a question regarding your own medical care, you always should call your transplant hospital first. However, for general organ transplant-related information, you can call the Organ Procurement and Transplantation Network (OPTN) toll-free patient services line at [phone]. Anyone, including potential transplant candidates, candidates, recipients, family members, friends, living donors, and donor family  members, can call this number to:          Talk about organ donation, living donation, the transplant process, the donation process, and transplant policies.    Get a free patient information kit with helpful booklets, waiting list and transplant information, and a list of all transplant hospitals.    Ask questions about the OPTN website (https://optn.transplant.hrsa.gov/), the [employer] s ([employer]) website (https://unos.org/), or the [employer] website for living donors and transplant recipients. (https://www.transplantliving.org/).    Learn how the OPTN can help you.    Talk about any concerns that you may have with a transplant hospital.    The [facility] transplant system, the OPTN, is managed under federal contract by the [employer] ([employer]), which is a non-profit charitable organization. The OPTN helps create and define organ sharing policies that make the best use of donated organs. This process continuously evaluating new advances and discoveries so policies can be adapted to best serve patients waiting for transplants. To do so, the OPTN works closely with transplant professionals, transplant patients, transplant candidates, donor families, living donors, and the public. All transplant programs and organ procurement organizations throughout the country are OPTN members and are obligated to follow the policies the OPTN creates for allocating organs.    The OPTN also is responsible for:      Providing educational material for patients, the public, and professionals.    Raising awareness of the need for donated organs and tissue.    Coordinating organ procurement, matching, and placement.    Collecting information about every organ transplant and donation that occurs in the [place].    Remember, you should contact your transplant hospital directly if you have questions or concerns about your own medical care including medical records, work-up progress, and test  results.    We are not your transplant hospital, and our staff will not be able to answer questions about your case, so please keep your transplant hospital s phone number handy.    However, while you research your transplant needs and learn as much as you can about transplantation and donation, we welcome your call to our toll-free patient services line at [phone].          Updated 4/1/2019

## 2021-10-09 ENCOUNTER — HEALTH MAINTENANCE LETTER (OUTPATIENT)
Age: 66
End: 2021-10-09

## 2022-01-29 ENCOUNTER — HEALTH MAINTENANCE LETTER (OUTPATIENT)
Age: 67
End: 2022-01-29

## 2022-03-26 ENCOUNTER — HEALTH MAINTENANCE LETTER (OUTPATIENT)
Age: 67
End: 2022-03-26

## 2022-09-11 ENCOUNTER — HEALTH MAINTENANCE LETTER (OUTPATIENT)
Age: 67
End: 2022-09-11

## 2023-05-06 ENCOUNTER — HEALTH MAINTENANCE LETTER (OUTPATIENT)
Age: 68
End: 2023-05-06

## 2024-05-04 ENCOUNTER — HEALTH MAINTENANCE LETTER (OUTPATIENT)
Age: 69
End: 2024-05-04

## (undated) RX ORDER — LIDOCAINE HYDROCHLORIDE 40 MG/ML
INJECTION, SOLUTION RETROBULBAR
Status: DISPENSED
Start: 2017-02-03